# Patient Record
Sex: FEMALE | Race: WHITE | NOT HISPANIC OR LATINO
[De-identification: names, ages, dates, MRNs, and addresses within clinical notes are randomized per-mention and may not be internally consistent; named-entity substitution may affect disease eponyms.]

---

## 2020-01-13 ENCOUNTER — RECORDS - HEALTHEAST (OUTPATIENT)
Dept: ADMINISTRATIVE | Facility: OTHER | Age: 40
End: 2020-01-13

## 2020-01-20 ENCOUNTER — RECORDS - HEALTHEAST (OUTPATIENT)
Dept: ADMINISTRATIVE | Facility: OTHER | Age: 40
End: 2020-01-20

## 2020-01-21 ENCOUNTER — RECORDS - HEALTHEAST (OUTPATIENT)
Dept: ADMINISTRATIVE | Facility: OTHER | Age: 40
End: 2020-01-21

## 2020-01-23 ENCOUNTER — COMMUNICATION - HEALTHEAST (OUTPATIENT)
Dept: ADMINISTRATIVE | Facility: CLINIC | Age: 40
End: 2020-01-23

## 2020-02-03 ENCOUNTER — OFFICE VISIT - HEALTHEAST (OUTPATIENT)
Dept: EDUCATION SERVICES | Facility: CLINIC | Age: 40
End: 2020-02-03

## 2020-02-03 DIAGNOSIS — O24.410 DIET CONTROLLED GESTATIONAL DIABETES MELLITUS (GDM), ANTEPARTUM: ICD-10-CM

## 2020-02-05 ENCOUNTER — COMMUNICATION - HEALTHEAST (OUTPATIENT)
Dept: ADMINISTRATIVE | Facility: CLINIC | Age: 40
End: 2020-02-05

## 2020-02-06 ENCOUNTER — COMMUNICATION - HEALTHEAST (OUTPATIENT)
Dept: EDUCATION SERVICES | Facility: CLINIC | Age: 40
End: 2020-02-06

## 2020-02-10 ENCOUNTER — AMBULATORY - HEALTHEAST (OUTPATIENT)
Dept: EDUCATION SERVICES | Facility: CLINIC | Age: 40
End: 2020-02-10

## 2020-02-10 DIAGNOSIS — O24.419 GESTATIONAL DIABETES: ICD-10-CM

## 2020-03-23 ENCOUNTER — OFFICE VISIT - HEALTHEAST (OUTPATIENT)
Dept: EDUCATION SERVICES | Facility: CLINIC | Age: 40
End: 2020-03-23

## 2020-03-23 DIAGNOSIS — O24.419 GESTATIONAL DIABETES: ICD-10-CM

## 2020-03-28 ENCOUNTER — ANESTHESIA - HEALTHEAST (OUTPATIENT)
Dept: OBGYN | Facility: HOSPITAL | Age: 40
End: 2020-03-28

## 2021-06-04 VITALS — WEIGHT: 196 LBS | BODY MASS INDEX: 31.64 KG/M2

## 2021-06-04 VITALS — WEIGHT: 192.9 LBS | BODY MASS INDEX: 31.13 KG/M2

## 2021-06-05 NOTE — TELEPHONE ENCOUNTER
Called pt. No answer. LM advising to have protein at HS and make sure she is getting enough cho with meals. Asked pt to call back if she is still having ketones, otherwise she will f/u with Bridget on Monday.

## 2021-06-05 NOTE — TELEPHONE ENCOUNTER
Date: 2/3/2020 Status: Rosalio   Time: 1:00 PM Length: 60   Visit Type: CONSULT [[id number]] Copay: $0.00   Provider: Qiana Aranda RN Department: MID DIABETES EDUCATION   Referring Provider: PELON FERREIRA CSN: [id number]   Notes: Referring Provider: Deanna Schwartz CNM  DX: Gestational Diabetes

## 2021-06-05 NOTE — TELEPHONE ENCOUNTER
Called pt. No answer. LM advising her to make sure she has protein at HS and is getting enough to eat throughout the day. Asked pt to call back if she is still having ketones or concerns. Otherwise to f/u with Bridget on Monday as scheduled.

## 2021-06-05 NOTE — TELEPHONE ENCOUNTER
[facility]   Referring Provider: Deanna Schwartz CNM  DX: Gestational Diabetes     Ref./rec. Were received in DM consult folder on 01.21.2020 @ 1:18

## 2021-06-05 NOTE — PROGRESS NOTES
[facility] GDM Care Plan    Assessment: Nakita is here today for initial education regarding gestational diabetes and management.  She arrives today unaccompanied and had a blood glucose meter with her that had been previously prescribed for her by her OB/GYN.  Nakita has been monitoring her blood sugars for a little over a week now prior to today's appointment and per meter review all readings both fasting and 1 hour postmeal has been within recommended target.  This morning her blood sugar was 75 mg/dL this is her fourth pregnancy-first time with gestational diabetes.  Nakita works a few part-time jobs that all keep her pretty active-she reports today baby is active as well.    Plan: Check blood glucose 4 times daily, check ketones each morning before breakfast  and follow dietary guidelines as recommended during today's encounter. Continue to take all medications as currently prescribed and include physical activity as often as possible and able  Provider: Dr. Dumont with [place] [facility]  Provider's Diagnosis (per referral form): Gestational (648.83)    Weight: 196 lb (88.9 kg)  OGTT: No results found for this or any previous visit.  EDC: Estimated Date of Delivery: April 10, 2020- they are waiting to find out the sex of the baby (surprise)   Pregnancy #: 4- she has 3 boys at home ages 11, 9 and 5  Previous GDM: No  Medications:   Current Outpatient Medications:      acetone, urine, test Strp, Use 1 strip As Directed daily., Disp: 100 strip, Rfl: 1     calcium, as carbonate, (TUMS) 200 mg calcium (500 mg) chewable tablet, Chew 1 tablet daily., Disp: , Rfl:   Supplements: see above  PNV: yes    Meter: Accu chek Natalia  BG: All BG readings have been within target both fasting and postmeal  BG monitoring goals: Fasting <95; 1 hour post start of meal <140. Test 4 x per day.  Check fasting a.m. ketones: Yes  GDM meal pattern/carb counting taught per guidelines: Yes  Goals: Nutrition: GDM meal plan  Activity:   Walking after meals when able/staying active  Monitoring:  BG 4x/day as directed, ketones every morning    F/u in 1 week to assess BG and food log -appointment has been scheduled for February 10 at [place]    DIABETES CARE PLAN AND EDUCATION RECORD    Gestational Diabetes Disease Process/Preconception Care/Management During Pregnancy/Postpartum:    Meter (per above goals): Discussed, Literature provided and Patient returned demonstration    Nutrition Management    Weight: Assessed, Discussed and Literature provided  Portions/Balance: Assessed, Discussed and Literature provided  Carb ID/Count: Assessed, Discussed and Literature provided  Label Reading: Assessed, Discussed and Literature provided  Menu Planning: Assessed, Discussed and Literature provided  Dining Out: Assessed, Discussed and Literature provided  Physical Activity: Discussed and Literature provided    Acute Complications: Prevent, Detect, Treat:    Goal Setting and Problem Solving: Assessed and Discussed  Barriers: Assessed and Discussed  Psychosocial Adjustments: Assessed and Discussed      Time: 60 minutes  Visit Type: GDM Individual  Visit #: 1    Qiana Aranda RN CDE  Diabetes Education  2/3/2020     I agree with the aforementioned diabetes plan.  Teetee STORY [facility]  2/3/2020  3:38 PM

## 2021-06-05 NOTE — PATIENT INSTRUCTIONS - HE
1. Check urine for ketones in the morning. Your goal is negative or trace ketones.      If you have ketones in your urine it means you are not eating enough before you go to     bed. Eat a larger bedtime snack and include protein.     2. Test blood sugar 4 times per day. Each morning before breakfast and 1 hour after meals.        Blood sugar goals:       Before breakfast: less 95      1 hour after meals: less 140    3. Eat 3 meals and 3 snacks per day according to the meal plan.       4. Staying active after meals helps to decrease blood sugar.    5. Keep a detailed food and blood sugar record and note ketone levels.

## 2021-06-06 NOTE — PROGRESS NOTES
Assessment: Nakita is here today for f/up gestational diabetes.   Nakita is doing well, active baby.   She is 31+ weeks gestation.   4th preg, 1st GDM..  She reports her dad was recently diagnosed with type 2 diabetes.   She did have bad cold this past week,  ketones trace, sm.  one moderate - when she was sick.  She is c/o being hungry.   She has food records,  good variety in diet     FBS:  81, 79 80, 82,   1 hr BF:  143 (fruit), 113, 135,  1 hr L:  138, 126, 120   1 hr D  154 (rib, corn/pota),  116, 151 (sick),  100     Plan: Continue to check blood glucose 4 times daily, check ketones each morning before breakfast  and follow 3 meals/ 3 snacks.  Encouraged her to increase protein and heart healthy fats;  ok to have two snacks mid meals if feeling hungry.   Discussed diabetes prevention as she questions this.   To return 3-4 weeks,   call before then if 3 or more readings above target in one week or ketones small or greater.        Provider: Dr. Dumont with [place] [facility]  Provider's Diagnosis (per referral form): Gestational (648.83)     Weight: 196 lb (88.9 kg)       192  lbs   2/10/20  OGTT: No results found for this or any previous visit.  EDC: Estimated Date of Delivery: April 10, 2020- they are waiting to find out the sex of the baby (surprise)   [place]  Pregnancy #: 4- she has 3 boys at home ages 11, 9 and 5  Previous GDM: No  Medications:   Current Outpatient Medications:      acetone, urine, test Strp, Use 1 strip As Directed daily., Disp: 100 strip, Rfl: 1     calcium, as carbonate, (TUMS) 200 mg calcium (500 mg) chewable tablet, Chew 1 tablet daily., Disp: , Rfl:   Supplements: see above  PNV: yes     Meter: Accu chek Natalia  BG: All BG readings have been within target both fasting and postmeal  BG monitoring goals: Fasting <95; 1 hour post start of meal <140. Test 4 x per day.  Check fasting a.m. ketones: Yes  GDM meal pattern/carb counting taught per guidelines: Yes  Goals:  Nutrition: GDM meal plan  Activity:  Walking after meals when able/staying active  Monitoring:  BG 4x/day as directed, ketones every morning   DIABETES CARE PLAN AND EDUCATION RECORD     Gestational Diabetes Disease Process/Preconception Care/Management During Pregnancy/Postpartum:     Meter (per above goals): Discussed, Literature provided and Patient returned demonstration- competent     Nutrition Management     Weight: Assessed, Discussed and Literature provided  Portions/Balance: Assessed, Discussed and Literature provided  Carb ID/Count: Assessed, Discussed and Literature provided  Label Reading: Assessed, Discussed and Literature provided  Menu Planning: Assessed, Discussed and Literature provided  Dining Out: Assessed, Discussed and Literature provided  Physical Activity: Discussed and Literature provided     Acute Complications: Prevent, Detect, Treat:     Goal Setting and Problem Solving: Assessed and Discussed  Barriers: Assessed and Discussed  Psychosocial Adjustments: Assessed and Discussed        Time: 30 minutes  Visit Type: GDM Individual  Visit #: 2    I agree with the aforementioned diabetes plan.  Teetee STORY [facility]  2/10/2020  11:58 AM

## 2021-06-07 NOTE — PROGRESS NOTES
SKIP GDM Care Plan  convert to telephone    Assessment: Nakita is a 39-year-old who presents today with gestational diabetes for a routine office visit to review blood sugars via telephone.  Per her report, she has been checking her blood sugars 4 times daily and monitoring her ketones each morning as had been requested.  She feels well, is keeping active and baby is active.  As today's visit is within 1 month of her scheduled due date, pt was informed  that I would provide her with her postpartum instructions as well today.    BG summary:  FB, 77, 83, 75, 74, 82, 87, 88, 88, 83  1 hour post breakfast: 90, 93, 105, 83, 132, 107, 99, 93, 86 (2-hour) 104  1 hour post lunch: 129, 123, 112, 133, 149 (grapes) 115, 110, 124  1 hour post dinner 113, 149 (egg roll-lettuce wraps-hoison sauce), 126, 123, 131, 140, 131, 126, 123  These readings were reviewed and discussed with Nakita today-informed her of the readings looked very good.      Visit Type: GDM Individual Follow-up via telephone  Time: 30 minutes  Visit #: 3  Provider: Dr. Dumont- [place] [facility]  Provider's Diagnosis (per referral form): Gestational (648.83)  Weight:    OGTT: No results found for this or any previous visit.   Estimated Date of Delivery: April 10, 2020- baby's gender will be a surprise  Pregnancy #: 3 has 3 boys at home ages 11, 9, and 5  Previous GDM: No   Medications:   Current Outpatient Medications:      calcium, as carbonate, (TUMS) 200 mg calcium (500 mg) chewable tablet, Chew 1 tablet daily., Disp: , Rfl:      acetone, urine, test Strp, Use 1 strip As Directed daily., Disp: 100 strip, Rfl: 1    BG monitoring goals: Fasting <95; 1 hour post start of meal <140. Test 4 x per day.  Check fasting a.m. ketones: Yes  GDM meal pattern/carb counting taught per guidelines: Yes    Past Goals:  Nutrition: GDM meal plan MET  Activity: Walking after meals when able/staying active MET  Monitoring: BG 4x/day as directed, ketones every  morning MET      New Goals:  Nutrition: GDM meal plan   Activity: Walking after meals when able/staying active   Monitoring: BG 4x/day as directed, ketones every morning    DIABETES CARE PLAN AND EDUCATION RECORD    Gestational Diabetes Disease Process/Preconception Care/Management During Pregnancy/Postpartum:Discussed    Meter (per above goals): Discussed and Competent    Nutrition Management    Weight: Assessed and Discussed  Portions/Balance: Assessed and Discussed  Carb ID/Count: Assessed and Discussed  Label Reading: Assessed and Discussed  Menu Planning: Assessed and Discussed  Dining Out: Assessed and Discussed  Physical Activity: Assessed and Discussed    Acute Complications: Prevent, Detect, Treat:    Goal Setting and Problem Solving: Assessed and Discussed  Barriers: Assessed and Discussed  Psychosocial Adjustments: Assessed and Discussed    Thank you,  Qiana Waite RN CDCES  Diabetes Care    Postpartum instructions were included in AVS today and mailed to patient    I agree with the aforementioned diabetes plan.  Teetee Dunbaraftab  [facility]  3/23/2020  4:31 PM

## 2021-06-07 NOTE — ANESTHESIA PROCEDURE NOTES
Epidural Block    Patient location during procedure: OB      Staffing:  Performing  Anesthesiologist: Hi Palacios MD  Preanesthetic Checklist  Completed: patient identified, risks, benefits, and alternatives discussed, timeout performed, consent obtained, airway assessed, oxygen available, suction available, emergency drugs available and hand hygiene performed  Procedure  Patient position: sitting  Prep: ChloraPrep  Patient monitoring: continuous pulse oximetry, heart rate and blood pressure  Approach: midline  Location: L2-L3  Injection technique: KALYANI saline  Number of Attempts:1  Needle  Needle type: Yeny   Needle gauge: 18 G     Catheter in Space: 5 (KALYANI at 6.5 cm, taped at 13 cm at skin)  Assessment  Sensory level:  No complications

## 2021-06-07 NOTE — ANESTHESIA POSTPROCEDURE EVALUATION
Patient: Nakita Gallegos  * No procedures listed *  Anesthesia type: epidural    Patient location: N05  Last vitals: No vitals data found for the desired time range.  Pt satisfied with LE, has ambulated.  Post vital signs: stable  Level of consciousness: alert  Post-anesthesia pain: pain controlled  Post-anesthesia nausea and vomiting: no  Pulmonary: room air  Cardiovascular: stable and blood pressure at baseline  Hydration: adequate  Anesthetic events: no    QCDR Measures:  ASA# 11 - Violet-op Cardiac Arrest: ASA11B - Patient did NOT experience unanticipated cardiac arrest  ASA# 12 - Violet-op Mortality Rate: ASA12B - Patient did NOT die  ASA# 13 - PACU Re-Intubation Rate: ASA13B - Patient did NOT require a new airway mgmt  ASA# 10 - Composite Anes Safety: ASA10A - No serious adverse event    Additional Notes:

## 2021-06-07 NOTE — ANESTHESIA PREPROCEDURE EVALUATION
Anesthesia Evaluation      Patient summary reviewed   No history of anesthetic complications     Airway   Mallampati: II  Neck ROM: full   Pulmonary - normal exam    breath sounds clear to auscultation  (-) not a smoker                         Cardiovascular - negative ROS  Exercise tolerance: > or = 4 METS  (-) murmur  Rhythm: regular  Rate: normal,    no murmur      Neuro/Psych - negative ROS     Endo/Other    (+) obesity (BMI 32.94), pregnant     GI/Hepatic/Renal - negative ROS      Other findings:  requesting labor epidural for analgesia. Prior successful .    Labs 3/28/20:  Hgb 12.0, Plt 179      Dental - normal exam                        Anesthesia Plan  Planned anesthetic: epidural    ASA 2     Anesthetic plan and risks discussed with: patient    Post-op plan: routine recovery

## 2021-06-07 NOTE — PATIENT INSTRUCTIONS - HE
Today will be patient's last visit.  Discussed continuing to check glucose 4 times a day and following meal plan until the day of delivery.    After you deliver the baby, it is suggested to check your blood sugar occasionally (1 - 2 times per week) for 6 weeks.   When you are not pregnant, normal blood sugars are:     Fasting (before eating anything in the morning)  - under 100 mg/dl  2 hours after meals under 140  The American Diabetes Association recommends:   a glucose tolerance test 6 weeks after you deliver the baby.      a hemoglobin Alc test yearly after delivery.  The Alc test is a 2-3 month average blood sugar reading.       Discuss getting these tests with your provider.    After delivery, and your provider has said that it is safe to be active, work toward getting 150 minutes each week of physical activity to decrease your risk of  getting type 2 diabetes.    Maintaining a healthy body weight will also decrease your risk of getting type 2 diabetes.

## 2021-06-16 PROBLEM — Z34.90 PREGNANT: Status: ACTIVE | Noted: 2020-03-28
